# Patient Record
Sex: MALE | Race: WHITE
[De-identification: names, ages, dates, MRNs, and addresses within clinical notes are randomized per-mention and may not be internally consistent; named-entity substitution may affect disease eponyms.]

---

## 2023-04-05 VITALS — SYSTOLIC BLOOD PRESSURE: 121 MMHG | DIASTOLIC BLOOD PRESSURE: 79 MMHG

## 2023-04-12 ENCOUNTER — HOSPITAL ENCOUNTER (OUTPATIENT)
Dept: HOSPITAL 46 - DS | Age: 58
Discharge: HOME | End: 2023-04-12
Attending: SURGERY
Payer: COMMERCIAL

## 2023-04-12 VITALS — WEIGHT: 230.47 LBS | HEIGHT: 78 IN | BODY MASS INDEX: 26.67 KG/M2

## 2023-04-12 VITALS — SYSTOLIC BLOOD PRESSURE: 128 MMHG | DIASTOLIC BLOOD PRESSURE: 79 MMHG

## 2023-04-12 VITALS — DIASTOLIC BLOOD PRESSURE: 81 MMHG | SYSTOLIC BLOOD PRESSURE: 141 MMHG

## 2023-04-12 VITALS — SYSTOLIC BLOOD PRESSURE: 114 MMHG | DIASTOLIC BLOOD PRESSURE: 87 MMHG

## 2023-04-12 VITALS — DIASTOLIC BLOOD PRESSURE: 80 MMHG | SYSTOLIC BLOOD PRESSURE: 151 MMHG

## 2023-04-12 DIAGNOSIS — Z83.71: ICD-10-CM

## 2023-04-12 DIAGNOSIS — Z80.0: ICD-10-CM

## 2023-04-12 DIAGNOSIS — K40.30: Primary | ICD-10-CM

## 2023-04-12 DIAGNOSIS — Z79.82: ICD-10-CM

## 2023-04-12 PROCEDURE — 0YQ60ZZ REPAIR LEFT INGUINAL REGION, OPEN APPROACH: ICD-10-PCS | Performed by: SURGERY

## 2023-04-12 PROCEDURE — 0VBG0ZZ EXCISION OF LEFT SPERMATIC CORD, OPEN APPROACH: ICD-10-PCS | Performed by: SURGERY

## 2023-04-12 NOTE — NUR
92643: PATIENT MORE AWAKE. DENIES PAIN. LEFT GROIN DRESSING CLEAN, DRY, AND
INTACT. IV SITE WNL. TOLERATING WATER. GIVEN PUDDING TO EAT. NO URGE TO VOID
YET. CALL LIGHT WITHIN REACH.

## 2023-04-12 NOTE — NUR
1615: VS CHECKED. DISCHARGED INSTRUCTIONS GIVEN TO PATIENT. PATIENT ASSISTED
OOB AND TO STAND AT BEDSIDE. NO C/O DIZZINESS. STAND BY ASSIST TO WALK TO
BATHROOM. VOIDED APPROXIMATELY 225 ML OF SUE COLORED URINE. GAIT STEADY TO
AND FROM BATHROOM. PATIENT GETTING DRESSED. RIDE CALLED.

## 2023-04-12 NOTE — NUR
04/12/23 1327 Nini Altman
1321-PATIENT ARRIVED TO PACU ON 8L MASK RR EVEN. PATIENT REACTIVE TO
VERBAL STIMULI EYES CLOSED RAISING HEAD OFF PILLOW. ORIENTED TO PACU.
CLOSES EYES AND DOZES BACK TO SLEEP. INCISION CDI ICE APPLIED.SR
 
1327-PATIENT AROUSING LIFTING HEAD OFF PILLOW OPENING EYES DENIES PAIN
OR NAUSEA. PLACED ON RA RR EVEN 98% PATIENT ENCOURAGED TO REST DOZES
BACK TO SLEEP

## 2023-04-12 NOTE — NUR
1405: PATIENT BACK IN DAY SURGERY ROOM FROM PACU. DENIES PAIN AND NAUSEA AT
THIS TIME. DROWSY. BUT EASILY AWAKENS TO VOICE. LEFT GROIN DRESSING CLEAN, DRY
AND INTACT. IV SITE WNL. SCDs ON. ICE WATER PLACED AT BEDSIDE. TAKING SIPS.
CALL LIGHT WITHIN REACH.

## 2023-04-12 NOTE — NUR
1635: PATIENT DRESSED. ICE PACK REFILLED. IV DC'D WNL. TIP INTACT. DRESSING
APPLIED. PATIENT DISCHARGED TO HOME VIA WHEELCHAIR WITH FAMILY MEMBER.

## 2023-04-13 NOTE — PATH
[facility]
                                    [address], [place]  [address]
_________________________________________________________________________________________
                                                                 Signed   
 
 
 
SPECIMEN(S): A INGUINAL HERNIA SAC
 
SPECIMEN SOURCE:
A. INGUINAL HERNIA SAC
 
CLINICAL HISTORY:
Inguinal hernia.
 
FINAL PATHOLOGIC DIAGNOSIS:
Inguinal hernia sac, herniorrhaphy:
-  Benign fibromembranous tissue most consistent with hernia sac.
AMB:em:C2NR
 
MICROSCOPIC EXAMINATION:
Histologic sections of all submitted blocks are examined by light microscopy.  
These findings, together with the gross examination, support the pathologic 
diagnosis. 
 
GROSS DESCRIPTION:
The specimen, labeled and designated "Jesse, a" and designated on the 
requisition "hernia sac, inguinal," is received in formalin and consists of a 
portion of red-brown, membranous soft tissue (12.0 
x 6.0 x 2.6 cm).  The tissue is serially sectioned to reveal pink-tan to 
yellow-tan soft cut surfaces.  Representative sections are submitted in 
cassette (A1). 
AC  (under the direct supervision of a pathologist)
The Gross Description was prepared using a voice recognition system. The report 
was reviewed for accuracy; however, sound-alike word errors, addition and/or 
deletions may occur. If there is any 
question about this report, please contact Client Services.
 
PERFORMING LABORATORY:
The technical component was performed by [facility], [address] [place] [id number] (CLIA# [id number]). Professional interpretation was 
performed by [facility], [facility], [address] [place] [id number] (CLIA#:  [id number]).
 
Diagnostician:  Myrna Cruz MD
Pathologist
Electronically Signed 04/13/2023
 
 
                                                                                    
_________________________________________________________________________________________
PATIENT NAME:     LEONARDO VIDES                    
MEDICAL RECORD #: [id number]            PATHOLOGY                     
          ACCT #: [id number]       ACCESSION #: [id number]     
DATE OF BIRTH:   [birth year]            REPORT #: [id number]       
PHYSICIAN:        [facility]              
PCP:              JAYCE HOOD PA-C       
REPORT IS CONFIDENTIAL AND NOT TO BE RELEASED WITHOUT AUTHORIZATION
 
 
                                  [address]  [address]
_________________________________________________________________________________________
                                                                 Signed   
 
 
Copies:                                
~
 
 
 
 
 
 
 
 
 
 
 
 
 
 
 
 
 
 
 
 
 
 
 
 
 
 
 
 
 
 
 
 
 
 
 
 
 
 
 
 
 
                                                                                    
_________________________________________________________________________________________
PATIENT NAME:     LEONARDO VIDES                    
MEDICAL RECORD #: [id number]            PATHOLOGY                     
          ACCT #: [id number]       ACCESSION #: [id number]     
DATE OF BIRTH:   [birth year]            REPORT #: [id number]       
PHYSICIAN:        [facility] PATHOLOGY              
PCP:              JAYCE HOOD PA-C       
REPORT IS CONFIDENTIAL AND NOT TO BE RELEASED WITHOUT AUTHORIZATION

## 2023-04-18 NOTE — OR
[facility]
                                    [address], [place]  [address]
_________________________________________________________________________________________
                                                                 Signed   
 
 
DATE OF OPERATION:
04/12/2023
 
SURGEON:
Angela Dunaway MD
 
PREOPERATIVE DIAGNOSIS:
Incarcerated moderate-to-large left inguinal hernia.
 
POSTOPERATIVE DIAGNOSES:
1. Incarcerated moderate-to-large left inguinal hernia.
2. Left hydrocelectomy.
 
PROCEDURE:
Bassini left inguinal herniorrhaphy (prolonged and difficult at 2.5 hours).
 
ESTIMATED BLOOD LOSS:
Minimal.
 
FINDINGS:
Leonardo indeed had the typical sliding left inguinal hernia containing the sigmoid colon.
He states the hernia has been there at least five years.  It was unbelievably scarred to
the external ring.  He also had a small to moderate sized noncommunicating hydrocele,
which we excised. 
 
INDICATIONS:
Leonardo is a 57-year-old gentleman I have known for quite a few years.  In fact, I have
helped him with a periumbilical hernia in the past that was composed of an umbilical and
an epigastric hernia.  I have also helped him with a colonoscopy.  He has worked
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  He said about five years ago, he noticed the left
inguinal hernia.  He said it has been getting worse.  It has been increasing in size.
He has had more and more trouble trying to reduce the hernia.  Finally, he is now unable
to reduce the hernia.  It is causing him trouble with bowel movements.  He said he lost
about 50 pounds which had helped.  Despite that, he still could not reduce the hernia.
He has been working with his primary care provider.  Ultrasound confirmed a rather large
inguinal hernia containing fat and bowel.  He said it is really limited his activities
and he has decided he really needed to have it repaired.  He had been asked to see me in
the office by his primary care provider.  I had met with Leonardo in the office and I could
easily see his large left inguinal hernia.  He was lying supine on exam table, we were
not able to reduce it.  I also explained to Leonardo he may very well have a hydrocele
component as well based on the physical exam findings.  The right testicle was
unremarkable.  In the office, I gave him our brochure on hernias.  We had looked at that
 
    Electronically Signed By: ANGELA DUNAWAY MD  04/18/23 0908
_________________________________________________________________________________________
PATIENT NAME:     LEONARDO VIDES                    
MEDICAL RECORD #: [id number]            OPERATIVE REPORT              
          ACCT #: [id number]  
DATE OF BIRTH:   [birth year]            REPORT #: [id number]      
PHYSICIAN:        ANGELA DUNAWAY MD             
PCP:              JAYCE HOOD PA-C       
REPORT IS CONFIDENTIAL AND NOT TO BE RELEASED WITHOUT AUTHORIZATION
 
 
                                  [facility]
                                    [address] [place] [address]  [id number]
_________________________________________________________________________________________
                                                                 Signed   
 
 
page by page.  I explained to him the nature of his inguinal hernia contained in the
sigmoid colon.  He understands the difference between a primary suture repair and a mesh
repair.  There is risk of surgery including, but not limited to bleeding, infection,
scarring, change in contour of the skin, damage to nerves, ischemic orchitis, recurrent
hernias and chronic pain.  He also understands this is a significant surgery much more
so than his periumbilical hernia repair.  He had expressed understanding and wished to
proceed. 
 
DESCRIPTION OF PROCEDURE:
I met with Leonardo in our preop area.  We both confirmed the left inguinal hernia and
marked it appropriately.  He did take the bowel prep before the surgery with good
results.  After this, he was taken into the operating room and placed in the supine
position under general endotracheal tube anesthesia.  He was given preoperative
antibiotics along with subcutaneous heparin.  SCDs were utilized.  He was prepped and
draped in the usual sterile fashion.  We then proceeded with a standard oblique incision
over the left groin.  This was carried carefully down through the tissue bluntly and
with the cautery.  We opened up the external oblique along its length and up to the
level of the external ring.  It took a few minutes to free the external oblique fascia
from the incarcerated hernia.  We worked our way down towards the pubic tubercle and
then back up towards the deep ring.  We then proceeded very carefully and bluntly with
our DeBakey forceps as well as our hemostats to dissect out his cord structures.  We
brought his testicle up into the operative field, but we left the gubernaculum in place.
 We opened up the hydrocele sac and excised it back down near at the base of the
testicle.  Clear liquid fluid was evacuated.  We followed the spermatic cord up towards
the deep ring and with very tedious dissection worked our way up to the deep ring.  His
hernia sac was quite thick.  He had tremendous chronic scarring where the external ring
had encircled his hernia and it was tremendous dissection.  We finally had to cut
through it with our cautery.  Even with the hernia sac now open, it was very difficult.
We caught just a little bit of his vas deferens so we just oversewed that with silk tie.
 After this, we finally were able to separate the cord structures all the way back down
to the deep ring.  As it is common, he had a sliding component.  We freed up the sliding
component about 4 cm so we could reduce the sigmoid colon back into the abdominal cavity
which we could now clearly see.  He had splayed out the deep ring at least 3 or 4 cm.
We suture ligated the hernia sac x2 with 2-0 PDS suture ligatures twice about a cm
apart.  We amputated the distal portion of the sac and passed it off the field for
pathologic review.  We decided we needed to close the myopectineal orifice by bringing
the conjoined tendon over to the ilioinguinal ligament and a standard Bassini
herniorrhaphy.  We used #1 Prolene and we started at the pubic tubercle.  We used
interrupted suture about every cm or slightly less.  We used a relaxing incision over
the rectus muscle just medial to the pubic tubercle and up about 4 cm to allow that to
come over quite nicely.  That allowed us to recreate the deep ring just large enough to
put the tip of my index finger.  After this, we injected local anesthetic in the
 
    Electronically Signed By: ANGELA DUNAWAY MD  04/18/23 0908
_________________________________________________________________________________________
PATIENT NAME:     LEONARDO VIDES                    
MEDICAL RECORD #: [id number]            OPERATIVE REPORT              
          ACCT #: [id number]  
DATE OF BIRTH:   [birth year]            REPORT #: [id number]      
PHYSICIAN:        ANGELA DUNAWAY MD             
PCP:              JAYCE HOOD PA-C       
REPORT IS CONFIDENTIAL AND NOT TO BE RELEASED WITHOUT AUTHORIZATION
 
 
                                  [facility]
                                    [address], [place]  [address]
_________________________________________________________________________________________
                                                                 Signed   
 
 
operative field.  We irrigated the wound and suctioned that out until clear.  We
returned the testicle down into the scrotum without any rotation.  We then brought the
external oblique fascia over the repair with a running 2-0 PDS suture.  We then closed
Zoya's fascia with a running 3-0 Monocryl suture.  The dermis was reapproximated with
interrupted 3-0 subcuticular Monocryl sutures.  The skin edges were reapproximated with
a running 5-0 fast absorbing plain gut suture.  After this, dry gauze and tape was
applied.  Since the case went 2.5 hours, we placed a Preston catheter at the end of the
procedure.  It only drained about 200 mL or so of clear yellow urine.  A Preston catheter
was then removed.  During the case, we actually had two separate groups of nurses to
scrub in and scrub out because the case was so long.  This is well over an hour 
and a half longer than usual.  Based on that, then it is a prolonged and difficult case
including the technical aspects as described above. 
 
 
 
            ________________________________________
            Angela Dunaway MD 
 
 
ALB/MODL
Job #:  [id number]
DD:  04/12/2023 13:31:51
DT:  04/12/2023 15:14:43
 
cc:            MD Jayce Cantrell PA
 
 
Copies:  ANGELA DUNAWAY MD
~
 
 
 
 
 
 
 
 
 
 
 
 
    Electronically Signed By: ANGELA DUNAWAY MD  04/18/23 0908
_________________________________________________________________________________________
PATIENT NAME:     LEONARDO VIDES                    
MEDICAL RECORD #: [id number]            OPERATIVE REPORT              
          ACCT #: [id number]  
DATE OF BIRTH:   [birth year]            REPORT #: [id number]      
PHYSICIAN:        ANGELA DUNAWAY MD             
PCP:              JAYCE HOOD PA-C       
REPORT IS CONFIDENTIAL AND NOT TO BE RELEASED WITHOUT AUTHORIZATION